# Patient Record
Sex: MALE | Race: BLACK OR AFRICAN AMERICAN | Employment: OTHER | URBAN - METROPOLITAN AREA
[De-identification: names, ages, dates, MRNs, and addresses within clinical notes are randomized per-mention and may not be internally consistent; named-entity substitution may affect disease eponyms.]

---

## 2018-08-22 ENCOUNTER — TELEPHONE (OUTPATIENT)
Dept: ORTHOPEDICS | Facility: CLINIC | Age: 78
End: 2018-08-22

## 2018-09-25 ENCOUNTER — TELEPHONE (OUTPATIENT)
Dept: ORTHOPEDICS | Facility: CLINIC | Age: 78
End: 2018-09-25

## 2018-09-25 NOTE — TELEPHONE ENCOUNTER
Called patient and was able to talk to him.    Said that he had not been able to walk since after his 08/2014 spine surgery; and that prior to surgery he was walking.  Uses a motorized wheelchair.  Says he is able to stand, but not for very long.    I asked about the HIPAA consent forms that were sent to him.  He said he received them and that he sent them back using the self-addressed envelope.    I offered to have him return to clinic, either with Dr. Brink (Neurosurgery) or myself (if she does not have an opening soon).  Will need CT myelogram (T and L spine) and EOS full spine ap-lat x-rays.    Confirmed his address.  He also gave me his cell number, which I added to his chart.

## 2019-03-14 NOTE — TELEPHONE ENCOUNTER
Tried calling patient today, in relation to PEEDS study, to which patient was enrolled when he had his spine surgery 08/2014 c/o Dr. Brink/Kurtis.    Last seen by Dr. Brink in clinic 3/24/16.    Was unable to contact patient.  There was only 1 number listed, and there was no answer (tried 2x today); cannot leave VM (says 'mailbox full').    Will mail HIPAA authorization form to address on file, with self-addressed stamped envelope.   Complex Repair And Split-Thickness Skin Graft Text: The defect edges were debeveled with a #15 scalpel blade.  The primary defect was closed partially with a complex linear closure.  Given the location of the defect, shape of the defect and the proximity to free margins a split thickness skin graft was deemed most appropriate to repair the remaining defect.  The graft was trimmed to fit the size of the remaining defect.  The graft was then placed in the primary defect, oriented appropriately, and sutured into place.